# Patient Record
Sex: MALE | Race: WHITE | NOT HISPANIC OR LATINO | Employment: OTHER | URBAN - METROPOLITAN AREA
[De-identification: names, ages, dates, MRNs, and addresses within clinical notes are randomized per-mention and may not be internally consistent; named-entity substitution may affect disease eponyms.]

---

## 2023-03-20 ENCOUNTER — OFFICE VISIT (OUTPATIENT)
Dept: UROLOGY | Facility: CLINIC | Age: 78
End: 2023-03-20
Payer: MEDICARE

## 2023-03-20 VITALS — BODY MASS INDEX: 26.22 KG/M2 | WEIGHT: 173 LBS | HEIGHT: 68 IN

## 2023-03-20 DIAGNOSIS — N13.8 BENIGN PROSTATIC HYPERPLASIA WITH URINARY OBSTRUCTION: Primary | ICD-10-CM

## 2023-03-20 DIAGNOSIS — N40.1 BENIGN PROSTATIC HYPERPLASIA WITH URINARY OBSTRUCTION: Primary | ICD-10-CM

## 2023-03-20 DIAGNOSIS — Z12.5 PROSTATE CANCER SCREENING: ICD-10-CM

## 2023-03-20 LAB
BILIRUBIN, UA POC OHS: NEGATIVE
BLOOD, UA POC OHS: NEGATIVE
CLARITY, UA POC OHS: CLEAR
COLOR, UA POC OHS: YELLOW
GLUCOSE, UA POC OHS: NEGATIVE
KETONES, UA POC OHS: NEGATIVE
LEUKOCYTES, UA POC OHS: ABNORMAL
NITRITE, UA POC OHS: NEGATIVE
PH, UA POC OHS: 6.5
PROTEIN, UA POC OHS: NEGATIVE
SPECIFIC GRAVITY, UA POC OHS: 1.02
UROBILINOGEN, UA POC OHS: 0.2

## 2023-03-20 PROCEDURE — 99203 OFFICE O/P NEW LOW 30 MIN: CPT | Mod: PBBFAC,PO | Performed by: STUDENT IN AN ORGANIZED HEALTH CARE EDUCATION/TRAINING PROGRAM

## 2023-03-20 PROCEDURE — 81003 URINALYSIS AUTO W/O SCOPE: CPT | Mod: PBBFAC,PO | Performed by: STUDENT IN AN ORGANIZED HEALTH CARE EDUCATION/TRAINING PROGRAM

## 2023-03-20 PROCEDURE — 99999 PR PBB SHADOW E&M-NEW PATIENT-LVL III: CPT | Mod: PBBFAC,,, | Performed by: STUDENT IN AN ORGANIZED HEALTH CARE EDUCATION/TRAINING PROGRAM

## 2023-03-20 PROCEDURE — 99204 PR OFFICE/OUTPT VISIT, NEW, LEVL IV, 45-59 MIN: ICD-10-PCS | Mod: S$PBB,,, | Performed by: STUDENT IN AN ORGANIZED HEALTH CARE EDUCATION/TRAINING PROGRAM

## 2023-03-20 PROCEDURE — 99999 PR PBB SHADOW E&M-NEW PATIENT-LVL III: ICD-10-PCS | Mod: PBBFAC,,, | Performed by: STUDENT IN AN ORGANIZED HEALTH CARE EDUCATION/TRAINING PROGRAM

## 2023-03-20 PROCEDURE — 99204 OFFICE O/P NEW MOD 45 MIN: CPT | Mod: S$PBB,,, | Performed by: STUDENT IN AN ORGANIZED HEALTH CARE EDUCATION/TRAINING PROGRAM

## 2023-03-20 RX ORDER — FENOFIBRATE 145 MG/1
145 TABLET, FILM COATED ORAL NIGHTLY
COMMUNITY
Start: 2023-01-22

## 2023-03-20 RX ORDER — LISINOPRIL 5 MG/1
5 TABLET ORAL DAILY
COMMUNITY
Start: 2022-09-26

## 2023-03-20 RX ORDER — EZETIMIBE 10 MG/1
10 TABLET ORAL NIGHTLY
COMMUNITY

## 2023-03-20 RX ORDER — ASPIRIN 81 MG/1
81 TABLET ORAL
COMMUNITY
End: 2023-06-19

## 2023-03-20 RX ORDER — ATENOLOL 25 MG/1
25 TABLET ORAL DAILY
COMMUNITY

## 2023-03-20 RX ORDER — SIMVASTATIN 40 MG/1
40 TABLET, FILM COATED ORAL DAILY
COMMUNITY

## 2023-03-20 RX ORDER — TAMSULOSIN HYDROCHLORIDE 0.4 MG/1
0.4 CAPSULE ORAL NIGHTLY
Qty: 90 CAPSULE | Refills: 3 | Status: SHIPPED | OUTPATIENT
Start: 2023-03-20 | End: 2023-06-19

## 2023-03-20 RX ORDER — TAMSULOSIN HYDROCHLORIDE 0.4 MG/1
CAPSULE ORAL
COMMUNITY
End: 2023-03-20

## 2023-03-20 RX ORDER — ACETAMINOPHEN 500 MG
2000 TABLET ORAL DAILY
COMMUNITY

## 2023-03-20 RX ORDER — MULTIVITAMIN WITH IRON
1 TABLET ORAL DAILY
COMMUNITY
End: 2023-04-21

## 2023-03-20 NOTE — PROGRESS NOTES
"[facility] - [facility]   Clinic Note    Subjective:     Chief Complaint: Other (Enlarged prostate)      History of Present Illness:  Abdulaziz Caldwell is a 78 y.o. male who presents to clinic for evaluation and management of BPH. He is new to our clinic referred by Aamir Leung MD     He reports a history of an elevated PSA a few months ago but is not sure what the value was. He was on Uroxatral and more recently switched to flomax. He reports improvement in his urinary symptoms. His nocturia has improved to 2x. He reports a decent stream with dribbling which he associates with a sensation of incomplete emptying. No hesitancy or intermittency.  PVR bladder scan was 71 mL.    IPSS Questionnaire (AUA-SS):  1) Incomplete Emptying 5 - Almost always   2) Frequency 0 - Not at all   3) Intermittency 3 - About half the time   4) Urgency 0 - Not at all   5) Weak Stream  0 - Not at all   6) Straining 0 - Not at all   7) Nocturia 2 - 2 times   Total score:   10   Quality of Life:  2 - Mostly Satisfied      He reports a history of microscopic hematuria and underwent a hematuria workup with Dr. Montana which was normal. This was around 3 years ago.     Unsure about family history of prostate cancer as he was adopted.     Past medical, family, surgical and social history reviewed as documented in chart with pertinent positive medical, family, surgical and social history detailed in HPI.    A review systems was conducted with pertinent positive and negative findings documented in HPI.    Objective:     Estimated body mass index is 26.3 kg/m² as calculated from the following:    Height as of this encounter: 5' 8" (1.727 m).    Weight as of this encounter: 78.5 kg (173 lb).    Vital Signs (Most Recent)       Physical Exam  Vitals and nursing note reviewed.   Constitutional:       General: He is not in acute distress.     Appearance: He is not ill-appearing or toxic-appearing.   Pulmonary:      Effort: Pulmonary effort is normal. No " accessory muscle usage or respiratory distress.   Genitourinary:     Comments: Prostate 35 g no nodules  Neurological:      Mental Status: He is alert.       No results found for: BUN, CREATININE, GFRNONAA, GFRAA, WBC, HGB, HCT, PLT, AST, ALT, ALKPHOS, ALBUMIN, HGBA1C     No results found for: PSA, PSADIAG, PSAFREE, PSAFREEPCT, PSARAT     Urine dipstick today showed no blood, trace leukocyte esterase, and negative nitrite.     Assessment:     1. Benign prostatic hyperplasia with urinary obstruction    2. Prostate cancer screening      Plan:     We discussed the association of lower urinary tract symptoms (LUTS) and prostate enlargement (BPH). The management of BPH varies widely depending on the degree of bother, adverse events related to BPH, and the overall size of the prostate. We discussed behavioral modifications, medical management,  and outlet procedures, as well as the indications, risks, and benefits.    Recommended next steps  - Prostate ultrasound for size evaluation  - office based flexible cystoscopy  - continue flomax  - obtain PSA from previous outside records - he will talk with Dr. Leung and bring results with him to his next appointment.    Per the NCCN guidelines, testing after 75 years of age should be done only in very healthy people with little or no comorbidity (especially if they have never undergone PSA testing or have a rising PSA) to detect the small number of aggressive cancers that pose a significant risk if left undetected until signs or symptoms develop. Widespread testing in this population would substantially increase rates of overdetection and is not recommended. Individuals ?60 years with a PSA <1.0 ng/mL and those >75 years of age with a PSA <3.0 ng/mL have a very low risk of prostate cancer metastases in their lifetime, and this low risk is especially true for those in the latter category.    Management pending the previous PSA result    Benito Fraser MD

## 2023-03-20 NOTE — LETTER
March 20, 2023        Aamir Leung MD  [address] [facility] [address]             [facility]  [address]  [facility] [address]  Phone: [phone]  Fax: [phone]   Patient: Abdulaziz Caldwell   MR Number: [id number]   YOB: 1945   Date of Visit: 3/20/2023       Dear Dr. Leung:    Thank you for referring Abdulaziz Caldwell to me for evaluation. Attached you will find relevant portions of my assessment and plan of care.    If you have questions, please do not hesitate to call me. I look forward to following Abdulaziz Caldwell along with you.    Sincerely,      Benito Fraser MD            CC  No Recipients    Enclosure

## 2023-03-28 ENCOUNTER — TELEPHONE (OUTPATIENT)
Dept: UROLOGY | Facility: CLINIC | Age: 78
End: 2023-03-28
Payer: MEDICARE

## 2023-03-28 NOTE — TELEPHONE ENCOUNTER
----- Message from Benito Fraser MD sent at 3/28/2023 12:13 PM CDT -----  Contact: pt  Okay I will discuss with him at his cystoscopy. Can you make sure his pelvic US gets scheduled prior to his cystoscopy?   ----- Message -----  From: Christie Thibodeaux MA  Sent: 3/28/2023  11:43 AM CDT  To: Benito Fraser MD      ----- Message -----  From: Karen Nunes  Sent: 3/28/2023  11:36 AM CDT  To: Evelio Oliver Staff    Pt wife is calling with PSA number [phone]

## 2023-04-06 ENCOUNTER — HOSPITAL ENCOUNTER (OUTPATIENT)
Dept: RADIOLOGY | Facility: HOSPITAL | Age: 78
Discharge: HOME | End: 2023-04-06
Attending: STUDENT IN AN ORGANIZED HEALTH CARE EDUCATION/TRAINING PROGRAM
Payer: MEDICARE

## 2023-04-06 DIAGNOSIS — Z12.5 PROSTATE CANCER SCREENING: ICD-10-CM

## 2023-04-06 DIAGNOSIS — N40.1 BENIGN PROSTATIC HYPERPLASIA WITH URINARY OBSTRUCTION: ICD-10-CM

## 2023-04-06 DIAGNOSIS — N13.8 BENIGN PROSTATIC HYPERPLASIA WITH URINARY OBSTRUCTION: ICD-10-CM

## 2023-04-06 PROCEDURE — 76857 US EXAM PELVIC LIMITED: CPT | Mod: TC,PO

## 2023-04-06 PROCEDURE — 76857 US PELVIS LIMITED NON OB: ICD-10-PCS | Mod: 26,,, | Performed by: RADIOLOGY

## 2023-04-06 PROCEDURE — 76857 US EXAM PELVIC LIMITED: CPT | Mod: 26,,, | Performed by: RADIOLOGY

## 2023-04-21 ENCOUNTER — PROCEDURE VISIT (OUTPATIENT)
Dept: UROLOGY | Facility: CLINIC | Age: 78
End: 2023-04-21
Payer: MEDICARE

## 2023-04-21 ENCOUNTER — TELEPHONE (OUTPATIENT)
Dept: UROLOGY | Facility: CLINIC | Age: 78
End: 2023-04-21

## 2023-04-21 VITALS — WEIGHT: 175.5 LBS | BODY MASS INDEX: 26.6 KG/M2 | HEIGHT: 68 IN

## 2023-04-21 DIAGNOSIS — N40.1 BENIGN PROSTATIC HYPERPLASIA WITH URINARY OBSTRUCTION: ICD-10-CM

## 2023-04-21 DIAGNOSIS — N13.8 BENIGN PROSTATIC HYPERPLASIA WITH URINARY OBSTRUCTION: ICD-10-CM

## 2023-04-21 DIAGNOSIS — N40.1 BENIGN PROSTATIC HYPERPLASIA WITH URINARY OBSTRUCTION: Primary | ICD-10-CM

## 2023-04-21 DIAGNOSIS — N13.8 BENIGN PROSTATIC HYPERPLASIA WITH URINARY OBSTRUCTION: Primary | ICD-10-CM

## 2023-04-21 PROCEDURE — 52000 CYSTOURETHROSCOPY: CPT | Mod: PBBFAC,PO | Performed by: STUDENT IN AN ORGANIZED HEALTH CARE EDUCATION/TRAINING PROGRAM

## 2023-04-21 PROCEDURE — 52000 CYSTOSCOPY: ICD-10-PCS | Mod: S$PBB,,, | Performed by: STUDENT IN AN ORGANIZED HEALTH CARE EDUCATION/TRAINING PROGRAM

## 2023-04-21 NOTE — PROCEDURES
Cystoscopy    Date/Time: 4/21/2023 2:00 PM  Performed by: Benito Fraser MD  Authorized by: Benito Fraser MD     Procedure:   Flexible cysto-urethroscopy    Pre Procedure Diagnosis:  BPH and LUTS    Post Procedure Diagnosis:  Same and bladder stone    Surgeon: Benito Fraser MD     Anesthesia: 2% uro-jet lidocaine jelly for local analgesia    Procedure note:  The risks and benefits were explained and informed consent was obtained. 2% lidocaine urojet was used for local analgesia. The genitalia was prepped and draped in the sterile fashion.    The flexible scope was advanced into the urethra and into the bladder. There were no urethral strictures noted throughout the course of the urethra.    The bladder was completely surveyed in a systematic fashion. The bilateral ureteral orifices were identified in their normal orthotopic locations bilaterally. The remainder of the bladder was evaluated. There was a bladder stone noted at the base of the bladder. There were 2+ trabeculations with saccules. No bladder tumors or lesions suspicious for malignancy were seen.     Upon retroflexion there was moderate median lobe intraprostatic protrusion. As the flexible cystoscope was being withdrawn, the prostate was evaluated carefully. The lateral lobes of the prostate were moderately enlarged.     The patient tolerated the procedure well without complication.     Findings in summary:  BPH with a bladder stone    Assessment: 78 y.o. male with BPH and LUTS with a bladder stone and 2+trabeculations.    I have explained the indication, risks, benefits, and alternatives of the procedure in detail. Risks including but not limited to bleeding, infection, injury to urethra, bladder, ureters, and rectum, possible bladder neck contracture, clot retention, need for additional procedure, erectile dysfunction, retrograde ejaculation, and urinary incontinence. He voices understanding and all questions have been answered. He agrees to proceed as  planned with bipolar TURP.     Plan:  Plan for TURP and cystolitholapaxy possible bladder biopsy  Follow up for surgery. Needs clearance from cards and PCP. Hold aspirin 7 days prior to surgery if possible

## 2023-05-04 ENCOUNTER — TELEPHONE (OUTPATIENT)
Dept: UROLOGY | Facility: CLINIC | Age: 78
End: 2023-05-04
Payer: MEDICARE

## 2023-05-10 PROBLEM — N21.0 BLADDER STONES: Status: ACTIVE | Noted: 2023-05-10

## 2023-05-17 ENCOUNTER — CLINICAL SUPPORT (OUTPATIENT)
Dept: UROLOGY | Facility: CLINIC | Age: 78
End: 2023-05-17
Payer: MEDICARE

## 2023-05-17 DIAGNOSIS — N40.1 BENIGN PROSTATIC HYPERPLASIA WITH URINARY OBSTRUCTION: Primary | ICD-10-CM

## 2023-05-17 DIAGNOSIS — N13.8 BENIGN PROSTATIC HYPERPLASIA WITH URINARY OBSTRUCTION: Primary | ICD-10-CM

## 2023-05-17 NOTE — PROGRESS NOTES
Patient to clinic for voiding trial , Pt coming in for removal of santoro catheter. Withdrew  45 Cc from balloon and removed 24 Fr santoro catheter intact. Provided voiding trial instruction. Pt expressed understanding and tolerated well.

## 2023-06-19 ENCOUNTER — OFFICE VISIT (OUTPATIENT)
Dept: UROLOGY | Facility: CLINIC | Age: 78
End: 2023-06-19
Payer: MEDICARE

## 2023-06-19 DIAGNOSIS — N13.8 BENIGN PROSTATIC HYPERPLASIA WITH URINARY OBSTRUCTION: Primary | ICD-10-CM

## 2023-06-19 DIAGNOSIS — N40.1 BENIGN PROSTATIC HYPERPLASIA WITH URINARY OBSTRUCTION: Primary | ICD-10-CM

## 2023-06-19 LAB
BILIRUBIN, UA POC OHS: NEGATIVE
BLOOD, UA POC OHS: ABNORMAL
CLARITY, UA POC OHS: ABNORMAL
COLOR, UA POC OHS: YELLOW
GLUCOSE, UA POC OHS: NEGATIVE
KETONES, UA POC OHS: NEGATIVE
LEUKOCYTES, UA POC OHS: ABNORMAL
NITRITE, UA POC OHS: NEGATIVE
PH, UA POC OHS: 6
POC RESIDUAL URINE VOLUME: 26 ML (ref 0–100)
PROTEIN, UA POC OHS: 100
SPECIFIC GRAVITY, UA POC OHS: >=1.03
UROBILINOGEN, UA POC OHS: 0.2

## 2023-06-19 PROCEDURE — 51798 US URINE CAPACITY MEASURE: CPT | Mod: PBBFAC,PO | Performed by: STUDENT IN AN ORGANIZED HEALTH CARE EDUCATION/TRAINING PROGRAM

## 2023-06-19 PROCEDURE — 99999 PR PBB SHADOW E&M-EST. PATIENT-LVL II: CPT | Mod: PBBFAC,,, | Performed by: STUDENT IN AN ORGANIZED HEALTH CARE EDUCATION/TRAINING PROGRAM

## 2023-06-19 PROCEDURE — 99212 OFFICE O/P EST SF 10 MIN: CPT | Mod: PBBFAC,PO | Performed by: STUDENT IN AN ORGANIZED HEALTH CARE EDUCATION/TRAINING PROGRAM

## 2023-06-19 PROCEDURE — 99024 PR POST-OP FOLLOW-UP VISIT: ICD-10-PCS | Mod: POP,,, | Performed by: STUDENT IN AN ORGANIZED HEALTH CARE EDUCATION/TRAINING PROGRAM

## 2023-06-19 PROCEDURE — 99024 POSTOP FOLLOW-UP VISIT: CPT | Mod: POP,,, | Performed by: STUDENT IN AN ORGANIZED HEALTH CARE EDUCATION/TRAINING PROGRAM

## 2023-06-19 PROCEDURE — 99999 PR PBB SHADOW E&M-EST. PATIENT-LVL II: ICD-10-PCS | Mod: PBBFAC,,, | Performed by: STUDENT IN AN ORGANIZED HEALTH CARE EDUCATION/TRAINING PROGRAM

## 2023-06-19 PROCEDURE — 81003 URINALYSIS AUTO W/O SCOPE: CPT | Mod: PBBFAC,PO | Performed by: STUDENT IN AN ORGANIZED HEALTH CARE EDUCATION/TRAINING PROGRAM

## 2023-06-19 NOTE — PROGRESS NOTES
"[place] [facility] - [facility]   Clinic Note    Subjective:     Chief Complaint: post op      History of Present Illness:  Abdulaziz Caldwell is a 78 y.o. male who presents to clinic for evaluation and management of BPH . He is established to our clinic    He underwent TURP and cystolitholapaxy on 5/11/23. He presents today for follow up. Previous IPSS was 10/2 and PVR was 71 cc.   IPSS today 3/0 and PVR 26 cc.     Past medical, family, surgical and social history reviewed as documented in chart with pertinent positive medical, family, surgical and social history detailed in HPI.    A review systems was conducted with pertinent positive and negative findings documented in HPI.    Objective:     Estimated body mass index is 25.85 kg/m² as calculated from the following:    Height as of 5/11/23: 5' 8" (1.727 m).    Weight as of 5/11/23: 77.1 kg (170 lb).    Vital Signs (Most Recent)       Physical Exam  Vitals and nursing note reviewed.   Constitutional:       General: He is not in acute distress.     Appearance: He is not ill-appearing or toxic-appearing.   Pulmonary:      Effort: Pulmonary effort is normal. No accessory muscle usage or respiratory distress.   Neurological:      Mental Status: He is alert.       Labs reviewed below:  Lab Results   Component Value Date    BUN 20 05/11/2023    CREATININE 1.17 05/11/2023    HGB 14.3 05/11/2023      Assessment:     1. Benign prostatic hyperplasia with urinary obstruction      Plan:     Doing excellent  Stop flomax  Follow up as needed    Benito Fraser MD   "

## 2024-11-14 ENCOUNTER — OFFICE VISIT (OUTPATIENT)
Dept: UROLOGY | Facility: CLINIC | Age: 79
End: 2024-11-14
Payer: MEDICARE

## 2024-11-14 VITALS — HEIGHT: 68 IN | BODY MASS INDEX: 26.19 KG/M2 | WEIGHT: 172.81 LBS

## 2024-11-14 DIAGNOSIS — N40.1 BPH WITH URINARY OBSTRUCTION: ICD-10-CM

## 2024-11-14 DIAGNOSIS — N13.8 BPH WITH URINARY OBSTRUCTION: ICD-10-CM

## 2024-11-14 DIAGNOSIS — Z90.79 S/P TURP: ICD-10-CM

## 2024-11-14 DIAGNOSIS — Z87.448 HISTORY OF BLADDER STONE: ICD-10-CM

## 2024-11-14 DIAGNOSIS — N39.41 URGE URINARY INCONTINENCE: Primary | ICD-10-CM

## 2024-11-14 LAB — POC RESIDUAL URINE VOLUME: 0 ML (ref 0–100)

## 2024-11-14 PROCEDURE — 3288F FALL RISK ASSESSMENT DOCD: CPT | Mod: CPTII,S$GLB,, | Performed by: STUDENT IN AN ORGANIZED HEALTH CARE EDUCATION/TRAINING PROGRAM

## 2024-11-14 PROCEDURE — 1160F RVW MEDS BY RX/DR IN RCRD: CPT | Mod: CPTII,S$GLB,, | Performed by: STUDENT IN AN ORGANIZED HEALTH CARE EDUCATION/TRAINING PROGRAM

## 2024-11-14 PROCEDURE — 1126F AMNT PAIN NOTED NONE PRSNT: CPT | Mod: CPTII,S$GLB,, | Performed by: STUDENT IN AN ORGANIZED HEALTH CARE EDUCATION/TRAINING PROGRAM

## 2024-11-14 PROCEDURE — 99999 PR PBB SHADOW E&M-EST. PATIENT-LVL III: CPT | Mod: PBBFAC,,, | Performed by: STUDENT IN AN ORGANIZED HEALTH CARE EDUCATION/TRAINING PROGRAM

## 2024-11-14 PROCEDURE — 51798 US URINE CAPACITY MEASURE: CPT | Mod: S$GLB,,, | Performed by: STUDENT IN AN ORGANIZED HEALTH CARE EDUCATION/TRAINING PROGRAM

## 2024-11-14 PROCEDURE — 1101F PT FALLS ASSESS-DOCD LE1/YR: CPT | Mod: CPTII,S$GLB,, | Performed by: STUDENT IN AN ORGANIZED HEALTH CARE EDUCATION/TRAINING PROGRAM

## 2024-11-14 PROCEDURE — G2211 COMPLEX E/M VISIT ADD ON: HCPCS | Mod: S$GLB,,, | Performed by: STUDENT IN AN ORGANIZED HEALTH CARE EDUCATION/TRAINING PROGRAM

## 2024-11-14 PROCEDURE — 1159F MED LIST DOCD IN RCRD: CPT | Mod: CPTII,S$GLB,, | Performed by: STUDENT IN AN ORGANIZED HEALTH CARE EDUCATION/TRAINING PROGRAM

## 2024-11-14 PROCEDURE — 99214 OFFICE O/P EST MOD 30 MIN: CPT | Mod: S$GLB,,, | Performed by: STUDENT IN AN ORGANIZED HEALTH CARE EDUCATION/TRAINING PROGRAM

## 2024-11-14 RX ORDER — TROSPIUM CHLORIDE 20 MG/1
20 TABLET, FILM COATED ORAL 2 TIMES DAILY
Qty: 60 TABLET | Refills: 11 | Status: SHIPPED | OUTPATIENT
Start: 2024-11-14 | End: 2025-11-14

## 2024-11-14 NOTE — PROGRESS NOTES
[facility] - [facility]rology   Clinic Note    Subjective:     Chief Complaint: Urinary Incontinence      History of Present Illness:  Abdulaziz Caldwell is a 79 y.o. male who presents to clinic for evaluation and management of LUTS. He is established to our clinic    He underwent TURP and cystolitholapaxy on 5/11/23. Prostate volume was 54 cc. He initially did well after surgery but presents today for follow up. IPSS post-op was 3/0 and PVR 26 cc.     IPSS today is 22/6 and PVR is 0 mL     Today, he presents with urinary incontinence and burning sensation during urination for the past 2 months.    Abdulaziz reports urinary incontinence for approximately 2 months, with episodes of bladder control loss lasting 4-5 days, alternating with periods of bladder control. He describes a burning sensation at the end of urination, which is nearly constant. The urinary stream is normal but of short duration, with frequent urination to keep the bladder consistently emptied. Over the past 2 months, he intermittently noticed small particulate matter in the toilet post-urination, which has recently resolved.    He states current symptoms differ from those pre-TURP. He notes that caffeine, particularly morning coffee, triggers an urgent need to urinate about 2 times before subsiding.    An annual follow-up with his PCP approximately 2 weeks ago included a urine test, which may have indicated minimal hematuria. Abdulaziz denies having a weak urinary stream or noticing any significant hematuria.    Abdulaziz consumes alcohol occasionally, having 1 beer every 2 months. In terms of caffeine intake, he drinks 2 small cups of coffee in the morning.           IPSS Questionnaire (AUA-SS):  1) Incomplete Emptying 5 - Almost always   2) Frequency 5 - Almost always   3) Intermittency 2 - Less than half the time   4) Urgency 5 - Almost always   5) Weak Stream  1 - Less than 1 time in 5   6) Straining 0 - Not at all   7) Nocturia 4 - 4 times   Total score:   22   Quality of Life:   "6 - Terrible        Past medical, family, surgical and social history reviewed as documented in chart with pertinent positive medical, family, surgical and social history detailed in HPI.    A review systems was conducted with pertinent positive and negative findings documented in HPI.    Objective:     Estimated body mass index is 26.28 kg/m² as calculated from the following:    Height as of this encounter: 5' 8" (1.727 m).    Weight as of this encounter: 78.4 kg (172 lb 13.5 oz).    Vital Signs (Most Recent)       General: No acute distress, well developed.   Head: Normocephalic, atraumatic  CV: no cyanosis  Lungs: normal respiratory effort, no respiratory distress    Labs reviewed below:  Lab Results   Component Value Date    BUN 20 05/11/2023    CREATININE 1.17 05/11/2023    HGB 14.3 05/11/2023        Unable to void today    Assessment:     1. Urge urinary incontinence    2. BPH with urinary obstruction    3. S/P TURP    4. History of bladder stone      Plan:     Assessment & Plan    - Considered potential causes for patient's incontinence and urinary symptoms: infection, stones, scar tissue/stricture from previous TURP, or overactive bladder  - Noted patient's strong urine stream and 0 post-void residual, suggesting functional bladder issues rather than obstruction  - Will initiate overactive bladder medication and perform cystoscopy to evaluate for structural abnormalities or stones    - Explained that caffeine, alcohol, spicy foods, and highly seasoned foods can be bladder irritants and trigger symptoms  - Discussed that water is generally the best fluid for bladder health    - Abdulaziz to avoid bladder irritants such as caffeine, alcohol, spicy foods, and highly seasoned foods    - Started Trospium twice daily for overactive bladder    - Cystoscopy ordered to evaluate for structural abnormalities, scar tissue, or stones in the bladder       The visit today included increased complexity associated with the care of " the episodic problem addressed above as well as managing the longitudinal care of the patient due to the serious and/or complex managed problem(s).      Benito Fraser MD

## 2024-12-17 ENCOUNTER — PROCEDURE VISIT (OUTPATIENT)
Dept: UROLOGY | Facility: CLINIC | Age: 79
End: 2024-12-17
Payer: OTHER GOVERNMENT

## 2024-12-17 VITALS — HEIGHT: 68 IN | BODY MASS INDEX: 26.69 KG/M2 | WEIGHT: 176.13 LBS

## 2024-12-17 DIAGNOSIS — N13.8 BPH WITH URINARY OBSTRUCTION: ICD-10-CM

## 2024-12-17 DIAGNOSIS — N39.41 URGE URINARY INCONTINENCE: ICD-10-CM

## 2024-12-17 DIAGNOSIS — Z90.79 S/P TURP: ICD-10-CM

## 2024-12-17 DIAGNOSIS — N40.1 BPH WITH URINARY OBSTRUCTION: ICD-10-CM

## 2024-12-17 LAB
BILIRUBIN, UA POC OHS: NEGATIVE
BLOOD, UA POC OHS: NEGATIVE
CLARITY, UA POC OHS: CLEAR
COLOR, UA POC OHS: YELLOW
GLUCOSE, UA POC OHS: NEGATIVE
KETONES, UA POC OHS: NEGATIVE
LEUKOCYTES, UA POC OHS: NEGATIVE
NITRITE, UA POC OHS: NEGATIVE
PH, UA POC OHS: 7
POC RESIDUAL URINE VOLUME: 48 ML (ref 0–100)
PROTEIN, UA POC OHS: NEGATIVE
SPECIFIC GRAVITY, UA POC OHS: 1.01
UROBILINOGEN, UA POC OHS: 0.2

## 2024-12-17 PROCEDURE — 99999PBSHW POCT BLADDER SCAN: Mod: PBBFAC,,,

## 2024-12-17 PROCEDURE — 52000 CYSTOURETHROSCOPY: CPT | Mod: PBBFAC,PO | Performed by: STUDENT IN AN ORGANIZED HEALTH CARE EDUCATION/TRAINING PROGRAM

## 2024-12-17 PROCEDURE — 51798 US URINE CAPACITY MEASURE: CPT | Mod: PBBFAC,PO | Performed by: STUDENT IN AN ORGANIZED HEALTH CARE EDUCATION/TRAINING PROGRAM

## 2024-12-17 PROCEDURE — 51741 ELECTRO-UROFLOWMETRY FIRST: CPT | Mod: PBBFAC,PO | Performed by: STUDENT IN AN ORGANIZED HEALTH CARE EDUCATION/TRAINING PROGRAM

## 2024-12-17 PROCEDURE — 81003 URINALYSIS AUTO W/O SCOPE: CPT | Mod: PBBFAC,PO | Performed by: STUDENT IN AN ORGANIZED HEALTH CARE EDUCATION/TRAINING PROGRAM

## 2024-12-17 PROCEDURE — 99999PBSHW POCT URINALYSIS(INSTRUMENT): Mod: PBBFAC,,,

## 2024-12-17 PROCEDURE — 51741 ELECTRO-UROFLOWMETRY FIRST: CPT | Mod: 26,S$PBB,, | Performed by: STUDENT IN AN ORGANIZED HEALTH CARE EDUCATION/TRAINING PROGRAM

## 2024-12-17 RX ORDER — METOPROLOL SUCCINATE 25 MG/1
25 TABLET, EXTENDED RELEASE ORAL
COMMUNITY
Start: 2024-12-03

## 2024-12-17 RX ORDER — CHOLECALCIFEROL (VITAMIN D3) 25 MCG
1000 TABLET ORAL DAILY
COMMUNITY

## 2024-12-17 RX ORDER — LANOLIN ALCOHOL/MO/W.PET/CERES
1000 CREAM (GRAM) TOPICAL DAILY
COMMUNITY

## 2024-12-17 RX ORDER — SACUBITRIL AND VALSARTAN 24; 26 MG/1; MG/1
1 TABLET, FILM COATED ORAL 2 TIMES DAILY
COMMUNITY
Start: 2024-12-03

## 2024-12-17 RX ORDER — SULFAMETHOXAZOLE AND TRIMETHOPRIM 800; 160 MG/1; MG/1
1 TABLET ORAL 2 TIMES DAILY
Qty: 10 TABLET | Refills: 0 | Status: SHIPPED | OUTPATIENT
Start: 2024-12-17 | End: 2024-12-22

## 2024-12-17 RX ORDER — ASPIRIN 81 MG/1
81 TABLET ORAL DAILY
COMMUNITY

## 2024-12-17 NOTE — PROCEDURES
Cystoscopy    Date/Time: 12/17/2024 1:00 PM    Performed by: Benito Fraser MD  Authorized by: Benito Fraser MD      Procedure:   Flexible cysto-urethroscopy    Pre Procedure Diagnosis:  BPH, LUTS, and history of prior urologic surgery    Post Procedure Diagnosis:  Same and bladder stones    Surgeon: Benito Fraser MD     Anesthesia: 2% uro-jet lidocaine jelly for local analgesia    Procedure note:  The risks and benefits were explained and informed consent was obtained.     With a comfortably full bladder, the patient was asked to spontaneously void, and he voided with results listed below.. Results were recorded with the calibrated electronic uroflowmeter.     Complex Uroflowmetry:        Maximum flow: 9.8 mL/sec Mean flow: 4.8 mL/sec   Voided volume: 243 mL PVR: 48 mL   Pattern: intermittent         2% lidocaine urojet was used for local analgesia. The genitalia was prepped and draped in the sterile fashion.    The flexible scope was advanced into the urethra and into the bladder. There were no urethral strictures noted throughout the course of the urethra.    The bladder was completely surveyed in a systematic fashion. The ureteral orifices were identified in their normal orthotopic locations bilaterally. There were multiple bladder stones which were somewhat adherent to the bladder neck circumferentially. no foreign bodies or stones. There were 1+ trabeculations and no diverticula. No bladder tumors or lesions suspicious for malignancy were seen.     Upon retroflexion there was a TURP defect with multiple stones again visualized. As the flexible cystoscope was being withdrawn, the bladder neck and prostate were evaluated. The lateral lobes of the prostate were not significantly enlarged.     The patient tolerated the procedure well without complication.     Findings in summary:           Assessment: bladder stones  Moderate flow on uroflow    Plan:  Recommend cystolitholapaxy  Follow up for surgery

## 2024-12-23 ENCOUNTER — TELEPHONE (OUTPATIENT)
Dept: UROLOGY | Facility: CLINIC | Age: 79
End: 2024-12-23
Payer: MEDICARE

## 2024-12-23 DIAGNOSIS — Z87.448 HISTORY OF BLADDER STONE: Primary | ICD-10-CM

## 2024-12-23 DIAGNOSIS — N21.0 BLADDER STONES: ICD-10-CM

## 2025-02-11 ENCOUNTER — OFFICE VISIT (OUTPATIENT)
Dept: UROLOGY | Facility: CLINIC | Age: 80
End: 2025-02-11
Payer: MEDICARE

## 2025-02-11 VITALS — WEIGHT: 174.38 LBS | HEIGHT: 68 IN | BODY MASS INDEX: 26.43 KG/M2

## 2025-02-11 DIAGNOSIS — Z90.79 S/P TURP: ICD-10-CM

## 2025-02-11 DIAGNOSIS — Z87.448 HISTORY OF BLADDER STONE: Primary | ICD-10-CM

## 2025-02-11 PROBLEM — N39.41 URGE URINARY INCONTINENCE: Status: RESOLVED | Noted: 2024-11-14 | Resolved: 2025-02-11

## 2025-02-11 LAB — POC RESIDUAL URINE VOLUME: 29 ML (ref 0–100)

## 2025-02-11 PROCEDURE — 99999 PR PBB SHADOW E&M-EST. PATIENT-LVL III: CPT | Mod: PBBFAC,,, | Performed by: STUDENT IN AN ORGANIZED HEALTH CARE EDUCATION/TRAINING PROGRAM

## 2025-02-11 NOTE — PROGRESS NOTES
[facility] - Urology   Clinic Note    Subjective:     Chief Complaint: Post-op Evaluation      History of Present Illness:  Abdulaziz Caldwell is a 79 y.o. male who presents to clinic for evaluation and management of LUTS. He is established to our clinic    He underwent TURP and cystolitholapaxy on 5/11/23. Prostate volume was 54 cc. He initially did well after surgery - IPSS post-op was 3/0.   He developed new onset urinary urgency with urge urinary incontinence.  This responded well to trospium, however on cystoscopy he was found to have multiple adherent bladder stones near the bladder neck and TURP defect.  He was taken to the OR for removal of these residual stones.  He presents today for follow-up.    IPSS today is 2/0    Abdulaziz reports a successful recovery He has discontinued the use of Trospium (Sanctura) and Pyridium, which were previously prescribed to relax the bladder.    Abdulaziz reports significant improvement in urinary symptoms. He no longer has incontinence or leakage, which were issues prior to the procedure. His urinary stream is excellent. Abdulaziz's urinary frequency has normalized, stating he urinates once during the night and once in the morning. During the day, he can go 4-5 hours between urinations, which he considers normal.      IPSS Questionnaire (AUA-SS):  1) Incomplete Emptying 0 - Not at all   2) Frequency 0 - Not at all   3) Intermittency 1 - Less than 1 time in 5   4) Urgency 0 - Not at all   5) Weak Stream  0 - Not at all   6) Straining 0 - Not at all   7) Nocturia 1 - 1 time   Total score:   2   Quality of Life:  0 - Delighted      Past medical, family, surgical and social history reviewed as documented in chart with pertinent positive medical, family, surgical and social history detailed in HPI.    A review systems was conducted with pertinent positive and negative findings documented in HPI.    Objective:     Estimated body mass index is 26.51 kg/m² as calculated from the following:    Height as of  "this encounter: 5' 8" (1.727 m).    Weight as of this encounter: 79.1 kg (174 lb 6.1 oz).    Vital Signs (Most Recent)       General: No acute distress, well developed.   Head: Normocephalic, atraumatic  CV: no cyanosis  Lungs: normal respiratory effort, no respiratory distress    Labs reviewed below:  Lab Results   Component Value Date    BUN 21 01/03/2025    CREATININE 1.23 01/03/2025    HGB 15.2 01/03/2025        Unable to void today    Assessment:     1. History of bladder stone    2. S/P TURP      Plan:     Assessment & Plan    - Confirmed successful removal of bladder stones from previous surgery  - Assessed that patient's symptoms have resolved post-stone removal, negating need for continued bladder relaxant medication (Trospium)  - Evaluated urinary function, noting improved stream and resolution of incontinence  - Considered possibility of stone recurrence, either from residual fragments or new formation    - Discontinued Trospium (Sanctura)  - Discontinued Pyridium  - Discontinued anti-inflammatory medication (15-day course completed)    - Follow up as needed if any urinary problems develop  - Contact the office if issues arise          Benito Fraser MD   "